# Patient Record
Sex: MALE | Race: WHITE
[De-identification: names, ages, dates, MRNs, and addresses within clinical notes are randomized per-mention and may not be internally consistent; named-entity substitution may affect disease eponyms.]

---

## 2019-06-09 ENCOUNTER — HOSPITAL ENCOUNTER (EMERGENCY)
Dept: HOSPITAL 10 - FTE | Age: 69
Discharge: HOME | End: 2019-06-09
Payer: MEDICARE

## 2019-06-09 ENCOUNTER — HOSPITAL ENCOUNTER (EMERGENCY)
Dept: HOSPITAL 91 - FTE | Age: 69
Discharge: HOME | End: 2019-06-09
Payer: MEDICARE

## 2019-06-09 VITALS
WEIGHT: 264.55 LBS | BODY MASS INDEX: 37.04 KG/M2 | WEIGHT: 264.55 LBS | HEIGHT: 71 IN | HEIGHT: 71 IN | BODY MASS INDEX: 37.04 KG/M2

## 2019-06-09 VITALS — SYSTOLIC BLOOD PRESSURE: 133 MMHG | RESPIRATION RATE: 18 BRPM | HEART RATE: 81 BPM | DIASTOLIC BLOOD PRESSURE: 75 MMHG
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
## 2019-06-09 DIAGNOSIS — S82.831A: Primary | ICD-10-CM
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
## 2019-06-09 PROCEDURE — 73610 X-RAY EXAM OF ANKLE: CPT

## 2019-06-09 PROCEDURE — 73590 X-RAY EXAM OF LOWER LEG: CPT

## 2019-06-09 PROCEDURE — 99283 EMERGENCY DEPT VISIT LOW MDM: CPT

## 2019-06-09 RX ADMIN — IBUPROFEN 1 MG: 200 TABLET, FILM COATED ORAL at 15:52

## 2019-06-09 NOTE — ERD
ER Documentation


Chief Complaint


Chief Complaint





RIGHT ANKLE TWIST, DENIES DIZZINESS, JUST A CLUMSY MOMENT





HPI


69-year-old male presents with right ankle pain and swelling after twisting it 


missed stepping 2 days ago.  He has restricted range of motion due to pain but 


no weakness or deficits.  He has abrasions on the anterior shin.  Denies head 


injury, neck pain, additional injuries.





ROS


All systems reviewed and are negative except as per history of present illness.





Medications


Home Meds


Active Scripts


Hydrocodone/Acetaminophen (Norco 5-325 Tablet) 1 Each Tablet, 1 TAB PO Q6H PRN 


for PAIN, #12 TAB


   Prov:DARRIUS WORTHY MD         6/9/19





Physical Exam


Vitals





Vital Signs


  Date      Temp  Pulse  Resp  B/P (MAP)   Pulse Ox  O2          O2 Flow    FiO2


Time                                                 Delivery    Rate


    6/9/19  98.0     90    18      148/85        97


     14:25                          (106)





Physical Exam


Const:   No acute distress


Head:   Atraumatic 


Eyes:    Normal Conjunctiva


ENT:    Normal External Ears, Nose and Mouth.


Neck:               Full range of motion. No meningismus.


Resp:   Clear to auscultation bilaterally


Cardio:   Regular rate and rhythm, no murmurs


Abd:    Soft, non tender, non distended. Normal bowel sounds


Skin:   No petechiae or rashes


Back:   No midline or flank tenderness


Ext:    No cyanosis, or edema


Neur:   Awake and alert


Psych:    Normal Mood and Affect


Results 24 hrs





Current Medications


 Medications
   Dose
          Sig/Nazario
       Start Time
   Status  Last


 (Trade)       Ordered        Route
 PRN     Stop Time              Admin
Dose


                              Reason                                Admin


 Ibuprofen
     400 mg         ONCE  ONCE
    6/9/19        DC            6/9/19


(Motrin)                      PO
            15:00
 6/9/19                15:52



                                             15:01








Procedures/MDM


ROCEDURE:   XR Ankle. 


 


CLINICAL INDICATION:  pain injury


 


TECHNIQUE:   AP, oblique and lateral views of the right ankle are available for 


review 


 


COMPARISON:   None available 


 


FINDINGS:


There is an oblique fracture of the distal fibula extending superiorly from the 


level of the joint line. There is widening of the lateral and medial clear space


on the ankle mortise view. A tiny avulsion fragment is also identified inferior 


to the medial malleolus. There is also posterior malleolar fracture.


No joint subluxation identified. 


 


There is soft tissue swelling overlying the lateral malleolus and medial 


malleolus extending anteriorly.


 


There is mild joint effusion.


No radiopaque foreign body is identified. 


 


 


IMPRESSION:


Acute oblique fracture of the distal fibula extending above the joint line, 


widening of the lateral and medial clear spaces, indicative of syndesmotic 


ligament and deltoid ligament injuries, and posterior malleolar fracture. Tiny 


avulsion fragment inferior to the medial malleolus. Fracture pattern combination


is most consistent with a Kowalski type B3. 


 


These findings were discussed with emergency department Darrius Florence  


at 6/9/2019 3:42:54 PM.


 


RPTAT: EE


_____________________________________________ 


Physician Stephania           Date    Time 


Electronically viewed and signed by Physician Stephania on 06/09/2019 15:44 


 


D:  06/09/2019 15:44  T:  06/09/2019 15:44


BP/





CC: DARRIUS WORTHY MD








Patient was given ibuprofen for pain.  Patient was placed in a right lower 


extremity walker boot and was neurovascular intact to the boot.  Immobilization 


was spent deferred given patient likely fall risk.  He is advised to try to 


maintain nonweightbearing status he was given crutches with crutch training.  


Patient was given a short course of Norco with recommendations for orthopedic 


follow-up to evaluate ligaments and white mortise seen on x-ray.  He has no 


signs to suggest septic arthritis, ischemia, deficits.





He was advised he may need authorization from primary doctor from orthopedist 


visit was given referrals nonetheless.  The patient was stable with no new 


complaints during the ER course. Clinically, there is no current evidence to 


suggest meningitis, sepsis, acute abdomen, pneumonia, stroke,  acute coronary 


syndrome, pulmonary embolism, aortic dissection or any other emergent condition 


appearing to require further evaluation or hospitalization.  Patient counseled 


regarding my diagnostic impression and care plan. Prior to discharge all 


questions answered. Pt agrees with treatment plan and understands strict return 


precautions. Pt is instructed to follow up with primary care provider within 24-


48 hours. Precautionary instructions provided including instructions to return 


to the ER if not improving or for any worsening or changing symptoms or 


concerns.





Disclaimer: Inadvertent spelling and grammatical errors are likely due to 


EHR/dictation software use and do not reflect on the overall quality of patient 


care. Also, please note that the electronic time recorded on this note does not 


necessarily reflect the actual time of the patient encounter.





Departure


Diagnosis:  


   Primary Impression:  


   Ankle fracture


   Encounter type:  initial encounter  Fracture type:  closed  Laterality:  


   right  Qualified Codes:  S82.891A - Other fracture of right lower leg, 


   initial encounter for closed fracture


Condition:  Stable


Patient Instructions:  Ankle Fracture (Distal Fibula), Closed


Referrals:  


RADHA HENDERSON MD








[facility]


Hours: Mon-Fri


9:00 AM - 5:00 PM





Additional Instructions:  


See orthopedist for follow-up.  There are findings on x-ray of distal fibula 


fracture as well as possible ligament injury.  May need authorization from 


primary doctor for orthopedist visit.  Recheck for fevers, redness, new 


worsening symptoms.











DARRIUS WORTHY MD              Jun 9, 2019 15:59